# Patient Record
Sex: FEMALE | Race: WHITE | Employment: STUDENT | URBAN - METROPOLITAN AREA
[De-identification: names, ages, dates, MRNs, and addresses within clinical notes are randomized per-mention and may not be internally consistent; named-entity substitution may affect disease eponyms.]

---
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
## 2024-01-09 DIAGNOSIS — R49.0 VOICE HOARSENESS: ICD-10-CM

## 2024-01-10 DIAGNOSIS — R49.0 VOICE HOARSENESS: ICD-10-CM

## 2024-01-10 RX ORDER — LEVOCETIRIZINE DIHYDROCHLORIDE 5 MG/1
5 TABLET, FILM COATED ORAL EVERY EVENING
Qty: 90 TABLET | Refills: 3 | Status: SHIPPED | OUTPATIENT
Start: 2024-01-10

## 2024-01-10 NOTE — TELEPHONE ENCOUNTER
Refill passed per [facility] protocol.    Requested Prescriptions   Pending Prescriptions Disp Refills    levocetirizine 5 MG Oral Tab 90 tablet 3     Sig: Take 1 tablet (5 mg total) by mouth every evening.       Allergy Medication Protocol Passed - 1/9/2024 10:58 AM        Passed - In person appointment or virtual visit in the past 12 mos or appointment in next 3 mos     Recent Outpatient Visits              3 weeks ago Frontal headache    [facility]    5 months [facility]    8 months ago Well adult exam    [facility]    [facility] Visit    1 year ago Need for vaccination    [facility] [place] [place]    Nurse Only    1 year ago Examination, routine, over 18 years of age    [facility] [place]    Nurse Only                            Recent Outpatient Visits              3 weeks [facility]    5 months ago [facility] depression    [facility]    8 months ago Well adult exam    [facility]    [facility]    1 year ago Need for vaccination    [facility] [place]    Nurse Only    1 year ago Examination, routine, over 18 years of age    [facility]    Nurse Only

## 2024-01-10 NOTE — TELEPHONE ENCOUNTER
fluticasone propionate 50 MCG/ACT Nasal Suspension, 2 sprays by Each Nare route nightly., Disp: 1 each, Rfl: 1

## 2024-01-11 RX ORDER — FLUTICASONE PROPIONATE 50 MCG
2 SPRAY, SUSPENSION (ML) NASAL NIGHTLY
Qty: 1 EACH | Refills: 3 | Status: SHIPPED | OUTPATIENT
Start: 2024-01-11 | End: 2025-01-05

## 2024-01-11 NOTE — TELEPHONE ENCOUNTER
Refill passed per [facility] protocol.    Requested Prescriptions   Pending Prescriptions Disp Refills    fluticasone propionate 50 MCG/ACT Nasal Suspension 1 each 1     Si sprays by Each Nare route nightly.       Allergy Medication Protocol Passed - 1/10/2024  1:31 PM        Passed - In person appointment or virtual visit in the past 12 mos or appointment in next 3 mos     Recent Outpatient Visits              4 weeks ago Frontal headache    [facility]    [facility]    5 months ago [facility]    8 months ago Well adult exam    [facility]    [facility] Visit    1 year ago Need for vaccination    [facility] [place]    Nurse Only    1 year ago Examination, routine, over 18 years of age    [facility] [place]    Nurse Only                           Recent Outpatient Visits              4 weeks ago Frontal [facility]    [facility]    5 months ago [facility]xiety and depression    [facility]    [facility]    8 months ago Well adult exam    [facility]    [facility]    1 year ago Need for vaccination    [facility] [place]    Nurse Only    1 year ago Examination, routine, over 18 years of age    [facility] [place]    Nurse Only

## 2024-03-06 ENCOUNTER — APPOINTMENT (OUTPATIENT)
Dept: URBAN - METROPOLITAN AREA CLINIC 244 | Age: 19
Setting detail: DERMATOLOGY
End: 2024-03-07

## 2024-03-06 DIAGNOSIS — L70.0 ACNE VULGARIS: ICD-10-CM

## 2024-03-06 DIAGNOSIS — L71.8 OTHER ROSACEA: ICD-10-CM

## 2024-03-06 PROCEDURE — OTHER PRESCRIPTION: OTHER

## 2024-03-06 PROCEDURE — OTHER PRESCRIPTION MEDICATION MANAGEMENT: OTHER

## 2024-03-06 PROCEDURE — OTHER COUNSELING: OTHER

## 2024-03-06 PROCEDURE — 99204 OFFICE O/P NEW MOD 45 MIN: CPT

## 2024-03-06 RX ORDER — BRIMONIDINE TARTRATE 5 MG/G
GEL TOPICAL
Qty: 30 | Refills: 3 | Status: ERX

## 2024-03-06 RX ORDER — BRIMONIDINE TARTRATE 5 MG/G
GEL TOPICAL
Qty: 30 | Refills: 3 | Status: ERX | COMMUNITY
Start: 2024-03-06

## 2024-03-06 RX ORDER — TRETIONIN 0.25 MG/G
CREAM TOPICAL
Qty: 45 | Refills: 2 | Status: ERX

## 2024-03-06 RX ORDER — TRETIONIN 0.25 MG/G
CREAM TOPICAL
Qty: 45 | Refills: 2 | Status: ERX | COMMUNITY
Start: 2024-03-06

## 2024-03-06 ASSESSMENT — LOCATION SIMPLE DESCRIPTION DERM
LOCATION SIMPLE: RIGHT CHEEK
LOCATION SIMPLE: LEFT CHEEK
LOCATION SIMPLE: LEFT FOREHEAD
LOCATION SIMPLE: RIGHT FOREHEAD

## 2024-03-06 ASSESSMENT — LOCATION DETAILED DESCRIPTION DERM
LOCATION DETAILED: LEFT INFERIOR MEDIAL FOREHEAD
LOCATION DETAILED: LEFT CENTRAL MALAR CHEEK
LOCATION DETAILED: RIGHT CENTRAL MALAR CHEEK
LOCATION DETAILED: RIGHT INFERIOR MEDIAL FOREHEAD

## 2024-03-06 ASSESSMENT — LOCATION ZONE DERM: LOCATION ZONE: FACE

## 2024-03-06 NOTE — PROCEDURE: COUNSELING
Spironolactone Counseling: Patient advised regarding risks of diarrhea, abdominal pain, hyperkalemia, birth defects (for female patients), liver toxicity and renal toxicity. The patient may need blood work to monitor liver and kidney function and potassium levels while on therapy. The patient verbalized understanding of the proper use and possible adverse effects of spironolactone.  All of the patient's questions and concerns were addressed.
Topical Sulfur Applications Counseling: Topical Sulfur Counseling: Patient counseled that this medication may cause skin irritation or allergic reactions.  In the event of skin irritation, the patient was advised to reduce the amount of the drug applied or use it less frequently.   The patient verbalized understanding of the proper use and possible adverse effects of topical sulfur application.  All of the patient's questions and concerns were addressed.
Erythromycin Pregnancy And Lactation Text: This medication is Pregnancy Category B and is considered safe during pregnancy. It is also excreted in breast milk.
Birth Control Pills Counseling: Birth Control Pill Counseling: I discussed with the patient the potential side effects of OCPs including but not limited to increased risk of stroke, heart attack, thrombophlebitis, deep venous thrombosis, hepatic adenomas, breast changes, GI upset, headaches, and depression.  The patient verbalized understanding of the proper use and possible adverse effects of OCPs. All of the patient's questions and concerns were addressed.
Azelaic Acid Pregnancy And Lactation Text: This medication is considered safe during pregnancy and breast feeding.
Detail Level: Simple
Bactrim Counseling:  I discussed with the patient the risks of sulfa antibiotics including but not limited to GI upset, allergic reaction, drug rash, diarrhea, dizziness, photosensitivity, and yeast infections.  Rarely, more serious reactions can occur including but not limited to aplastic anemia, agranulocytosis, methemoglobinemia, blood dyscrasias, liver or kidney failure, lung infiltrates or desquamative/blistering drug rashes.
Topical Clindamycin Counseling: Patient counseled that this medication may cause skin irritation or allergic reactions.  In the event of skin irritation, the patient was advised to reduce the amount of the drug applied or use it less frequently.   The patient verbalized understanding of the proper use and possible adverse effects of clindamycin.  All of the patient's questions and concerns were addressed.
Sarecycline Counseling: Patient advised regarding possible photosensitivity and discoloration of the teeth, skin, lips, tongue and gums.  Patient instructed to avoid sunlight, if possible.  When exposed to sunlight, patients should wear protective clothing, sunglasses, and sunscreen.  The patient was instructed to call the office immediately if the following severe adverse effects occur:  hearing changes, easy bruising/bleeding, severe headache, or vision changes.  The patient verbalized understanding of the proper use and possible adverse effects of sarecycline.  All of the patient's questions and concerns were addressed.
Doxycycline Pregnancy And Lactation Text: This medication is Pregnancy Category D and not consider safe during pregnancy. It is also excreted in breast milk but is considered safe for shorter treatment courses.
Aklief Pregnancy And Lactation Text: It is unknown if this medication is safe to use during pregnancy.  It is unknown if this medication is excreted in breast milk.  Breastfeeding women should use the topical cream on the smallest area of the skin for the shortest time needed while breastfeeding.  Do not apply to nipple and areola.
Minocycline Counseling: Patient advised regarding possible photosensitivity and discoloration of the teeth, skin, lips, tongue and gums.  Patient instructed to avoid sunlight, if possible.  When exposed to sunlight, patients should wear protective clothing, sunglasses, and sunscreen.  The patient was instructed to call the office immediately if the following severe adverse effects occur:  hearing changes, easy bruising/bleeding, severe headache, or vision changes.  The patient verbalized understanding of the proper use and possible adverse effects of minocycline.  All of the patient's questions and concerns were addressed.
Tazorac Counseling:  Patient advised that medication is irritating and drying.  Patient may need to apply sparingly and wash off after an hour before eventually leaving it on overnight.  The patient verbalized understanding of the proper use and possible adverse effects of tazorac.  All of the patient's questions and concerns were addressed.
Dapsone Pregnancy And Lactation Text: This medication is Pregnancy Category C and is not considered safe during pregnancy or breast feeding.
Azithromycin Counseling:  I discussed with the patient the risks of azithromycin including but not limited to GI upset, allergic reaction, drug rash, diarrhea, and yeast infections.
Tetracycline Pregnancy And Lactation Text: This medication is Pregnancy Category D and not consider safe during pregnancy. It is also excreted in breast milk.
Winlevi Pregnancy And Lactation Text: This medication is considered safe during pregnancy and breastfeeding.
Use Enhanced Medication Counseling?: No
Topical Sulfur Applications Pregnancy And Lactation Text: This medication is Pregnancy Category C and has an unknown safety profile during pregnancy. It is unknown if this topical medication is excreted in breast milk.
Topical Retinoid counseling:  Patient advised to apply a pea-sized amount only at bedtime and wait 30 minutes after washing their face before applying.  If too drying, patient may add a non-comedogenic moisturizer. The patient verbalized understanding of the proper use and possible adverse effects of retinoids.  All of the patient's questions and concerns were addressed.
High Dose Vitamin A Counseling: Side effects reviewed, pt to contact office should one occur.
Birth Control Pills Pregnancy And Lactation Text: This medication should be avoided if pregnant and for the first 30 days post-partum.
Spironolactone Pregnancy And Lactation Text: This medication can cause feminization of the male fetus and should be avoided during pregnancy. The active metabolite is also found in breast milk.
Benzoyl Peroxide Counseling: Patient counseled that medicine may cause skin irritation and bleach clothing.  In the event of skin irritation, the patient was advised to reduce the amount of the drug applied or use it less frequently.   The patient verbalized understanding of the proper use and possible adverse effects of benzoyl peroxide.  All of the patient's questions and concerns were addressed.
Isotretinoin Counseling: Patient should get monthly blood tests, not donate blood, not drive at night if vision affected, not share medication, and not undergo elective surgery for 6 months after tx completed. Side effects reviewed, pt to contact office should one occur.
Topical Clindamycin Pregnancy And Lactation Text: This medication is Pregnancy Category B and is considered safe during pregnancy. It is unknown if it is excreted in breast milk.
Bactrim Pregnancy And Lactation Text: This medication is Pregnancy Category D and is known to cause fetal risk.  It is also excreted in breast milk.
Tazorac Pregnancy And Lactation Text: This medication is not safe during pregnancy. It is unknown if this medication is excreted in breast milk.
Erythromycin Counseling:  I discussed with the patient the risks of erythromycin including but not limited to GI upset, allergic reaction, drug rash, diarrhea, increase in liver enzymes, and yeast infections.
Azelaic Acid Counseling: Patient counseled that medicine may cause skin irritation and to avoid applying near the eyes.  In the event of skin irritation, the patient was advised to reduce the amount of the drug applied or use it less frequently.   The patient verbalized understanding of the proper use and possible adverse effects of azelaic acid.  All of the patient's questions and concerns were addressed.
Azithromycin Pregnancy And Lactation Text: This medication is considered safe during pregnancy and is also secreted in breast milk.
Aklief counseling:  Patient advised to apply a pea-sized amount only at bedtime and wait 30 minutes after washing their face before applying.  If too drying, patient may add a non-comedogenic moisturizer.  The most commonly reported side effects including irritation, redness, scaling, dryness, stinging, burning, itching, and increased risk of sunburn.  The patient verbalized understanding of the proper use and possible adverse effects of retinoids.  All of the patient's questions and concerns were addressed.
Doxycycline Counseling:  Patient counseled regarding possible photosensitivity and increased risk for sunburn.  Patient instructed to avoid sunlight, if possible.  When exposed to sunlight, patients should wear protective clothing, sunglasses, and sunscreen.  The patient was instructed to call the office immediately if the following severe adverse effects occur:  hearing changes, easy bruising/bleeding, severe headache, or vision changes.  The patient verbalized understanding of the proper use and possible adverse effects of doxycycline.  All of the patient's questions and concerns were addressed.
High Dose Vitamin A Pregnancy And Lactation Text: High dose vitamin A therapy is contraindicated during pregnancy and breast feeding.
Topical Retinoid Pregnancy And Lactation Text: This medication is Pregnancy Category C. It is unknown if this medication is excreted in breast milk.
Dapsone Counseling: I discussed with the patient the risks of dapsone including but not limited to hemolytic anemia, agranulocytosis, rashes, methemoglobinemia, kidney failure, peripheral neuropathy, headaches, GI upset, and liver toxicity.  Patients who start dapsone require monitoring including baseline LFTs and weekly CBCs for the first month, then every month thereafter.  The patient verbalized understanding of the proper use and possible adverse effects of dapsone.  All of the patient's questions and concerns were addressed.
Winlevi Counseling:  I discussed with the patient the risks of topical clascoterone including but not limited to erythema, scaling, itching, and stinging. Patient voiced their understanding.
Tetracycline Counseling: Patient counseled regarding possible photosensitivity and increased risk for sunburn.  Patient instructed to avoid sunlight, if possible.  When exposed to sunlight, patients should wear protective clothing, sunglasses, and sunscreen.  The patient was instructed to call the office immediately if the following severe adverse effects occur:  hearing changes, easy bruising/bleeding, severe headache, or vision changes.  The patient verbalized understanding of the proper use and possible adverse effects of tetracycline.  All of the patient's questions and concerns were addressed. Patient understands to avoid pregnancy while on therapy due to potential birth defects.
Benzoyl Peroxide Pregnancy And Lactation Text: This medication is Pregnancy Category C. It is unknown if benzoyl peroxide is excreted in breast milk.
Isotretinoin Pregnancy And Lactation Text: This medication is Pregnancy Category X and is considered extremely dangerous during pregnancy. It is unknown if it is excreted in breast milk.

## 2024-04-15 ENCOUNTER — NURSE TRIAGE (OUTPATIENT)
Dept: FAMILY MEDICINE CLINIC | Facility: CLINIC | Age: 19
End: 2024-04-15

## 2024-04-15 NOTE — TELEPHONE ENCOUNTER
Action Requested: Summary for Provider     []  Critical Lab, Recommendations Needed  [] Need Additional Advice  []   FYI    []   Need Orders  [] Need Medications Sent to Pharmacy  []  Other     SUMMARY: Currently out of State in [place] for [employer],had allergic reaction from the allergy skin test, frequent cough , went to ED and prednisone was prescribed but it is not helping her, still with frequent cough ,  instructed to go to  near her, or ED if with shortness of breath .     Reason for call: Medication Reaction (Allergy test reactions)  Onset: Wednesday       Allergist was the one who ordered the allergy skin test on Wednesday but not available to see her not until in  2 weeks, there is no on call per patient .   Frequent cough, feels like she has a cold.   No sob, no wheezes.           Reason for Disposition   MILD asthma attack (e.g., no SOB at rest, mild SOB with walking, speaks normally in sentences, mild wheezing) and lasting > 24 hours on prescribed treatment    Protocols used: Asthma Attack-A-[place]

## 2024-04-29 ENCOUNTER — TELEPHONE (OUTPATIENT)
Dept: PULMONOLOGY | Facility: CLINIC | Age: 19
End: 2024-04-29

## 2024-04-29 NOTE — TELEPHONE ENCOUNTER
Regarding: FW: Cough  Contact: [phone]        ----- Message -----  From: Karla Bonilla RN  Sent: 4/29/2024   1:36 PM CDT  To: Salma Stewart MD  Subject: Cough                                            ----- Message from Karla Bonilla RN sent at 4/29/2024  1:36 PM CDT -----       ----- Message from Richy Schmid TOBIN to Salma Stewart MD sent at 4/29/2024  9:46 AM -----   Good morning.  This is Richy's Mom.  Richy still has her cough.  She saw an ENT in [place] as well as the doctors at school and Urgent Care.  They have tried numerous medications:  Doxyclcline (2 times) Azithromycin, Bromphen and Benzonatate.  None of these helped.  Her ENT started her on Montelukast and this seems to be helping (but not 100%).  He suggested she see a Pulmonologist.  Not sure if this is Asthma or Allergies.  She did get tested for allergies and the panel came back negative, but she had a reaction 24 hours later (hives all over her arm) and ended up in the ER to control the rash from spreading.      I have tried a number of Pulmonologist  in [place] as she is returning from School on May 19th, but no one can see her til late August and September.  She is on the \"wait list\" for Dr. Jc Soliz but his first appt. was late September.  I am very concerned with not getting this addressed as the cough has lessoned,  is still there.  Richy says that it seems better when she is outside.  She does have a guinea pig but has had her for over a year so not sure if this is causing the cough.       Can you please help us get help for this?      Richy' Mom, Christi

## 2024-04-29 NOTE — TELEPHONE ENCOUNTER
Spoke with patient's mother Esperanza (RAFFI on file). Appointment scheduled for for 5/30/24 at 1:30PM. Verified date, time, place, and where to park. Mother verbalized understanding.

## 2024-04-29 NOTE — TELEPHONE ENCOUNTER
Salma Stewart MD   [facility] Clinical Staff27 minutes ago (3:38 PM)       Hi Staff     This young woman is currently at college and away and has been dealing with a cough for about 6 months.  She will be returning May 19.  Mother states she has seen multiple physicians including allergist and ENT and no relief of symptoms  She is try to get an appointment for her daughter.  Please see Clzby message  Anything earlier than August September available?  Thank you so much

## 2024-05-29 ENCOUNTER — TELEPHONE (OUTPATIENT)
Dept: PULMONOLOGY | Facility: CLINIC | Age: 19
End: 2024-05-29

## 2024-05-29 NOTE — TELEPHONE ENCOUNTER
Received office visit notes from ENT and Allergy dated 4/8/2024. Placed in Dr. Soliz folder for review.

## 2024-05-30 ENCOUNTER — OFFICE VISIT (OUTPATIENT)
Dept: PULMONOLOGY | Facility: CLINIC | Age: 19
End: 2024-05-30

## 2024-05-30 VITALS
WEIGHT: 143 LBS | SYSTOLIC BLOOD PRESSURE: 101 MMHG | DIASTOLIC BLOOD PRESSURE: 65 MMHG | HEART RATE: 74 BPM | OXYGEN SATURATION: 99 % | BODY MASS INDEX: 24.41 KG/M2 | RESPIRATION RATE: 14 BRPM | HEIGHT: 64 IN

## 2024-05-30 DIAGNOSIS — R05.3 CHRONIC COUGH: Primary | ICD-10-CM

## 2024-05-30 PROCEDURE — 99203 OFFICE O/P NEW LOW 30 MIN: CPT | Performed by: INTERNAL MEDICINE

## 2024-05-30 NOTE — PROGRESS NOTES
Dear  Salma  :           As you know, Richy is a 19-year-old female who I am now evaluating for lingering cough.       HISTORY OF PRESENT ILLNESS: November of last year the patient developed a head cold which then settled into the chest and took on the life of its own with constant hacking over the next 5 months.  Just approximately 1 month ago did the cough ultimately kaz primarily in response to the initiation of Singulair.  The patient is a student at [employer] and she lives with a guinea pig.  She had been trialed on antibiotics and steroids and nothing seemed to have helped her until the Singulair.  She also had tried Dulera.  She admits to having had shortness of breath with cough and wheezing and constantly clearing her throat.  She admits to postnasal drip.  There is no GERD.  She had a chest x-ray in December 2023 that was unremarkable.    PAST MEDICAL AND SURGICAL HISTORY:   1.  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 Ear nose and throat normal. Bowel normal. Bladder function normal. No depression. No thyroid disease. No lymphatic system concerns.  No rash. Muscles and joints unremarkable. No weight loss no weight gain.    PHYSICAL EXAMINATION: Physical Examination:  Vital signs normal. HEENT examination is unremarkable with pupils equal round and reactive to light and accommodation. Neck without adenopathy, thyromegaly, JVD nor bruit. Lungs clear to auscultation and percussion. Cardiac regular rate and rhythm no murmur. Abdomen nontender, without hepatosplenomegaly and no mass appreciable. Extremities and Musculoskeletal without clubbing cyanosis nor edema, and mobility  acceptable. Neurologic grossly intact with symmetric tone and strength and reflex.    LABORATORY: Chest x-ray December 2023 normal    ASSESSMENT AND PLAN:  PROBLEM 1.  Lingering cough-my strong suspicion is that the patient had mild cough equivalent asthma as well as probably a component of postviral airway hyperreactivity.  She lives with a guinea pig and everybody who lives with guinea pig along in health develops an allergic response.  She had an unusual response to cutaneous allergy testing where she was told that she was negative but then she ended up having to go to the emergency room due to severity of diffuse blistering of the upper extremities.  Strongly suspect allergic component.    RECOMMENDATIONS:  1.  Singulair as needed  2.  Reassurance  3.  Stay away from the guinea pig or at least keep the area extremely well ventilated.  4.  Contact me promptly if recurrence or new respiratory trouble.  Return on an as-needed basis.    I am delighted to assist in Richy's care.            With warmest regards,     Jc Soliz MD  Medical Director, Critical Care, [facility]  Medical Director, [facility]

## 2024-07-19 ENCOUNTER — NURSE TRIAGE (OUTPATIENT)
Dept: FAMILY MEDICINE CLINIC | Facility: CLINIC | Age: 19
End: 2024-07-19

## 2024-07-19 ENCOUNTER — HOSPITAL ENCOUNTER (OUTPATIENT)
Age: 19
Discharge: HOME | End: 2024-07-19
Attending: EMERGENCY MEDICINE
Payer: COMMERCIAL

## 2024-07-19 VITALS
HEART RATE: 79 BPM | OXYGEN SATURATION: 100 % | RESPIRATION RATE: 16 BRPM | SYSTOLIC BLOOD PRESSURE: 117 MMHG | TEMPERATURE: 98 F | DIASTOLIC BLOOD PRESSURE: 69 MMHG

## 2024-07-19 DIAGNOSIS — N76.0 ACUTE VAGINITIS: Primary | ICD-10-CM

## 2024-07-19 LAB
B-HCG UR QL: NEGATIVE
BILIRUB UR QL STRIP: NEGATIVE
CLARITY UR: CLEAR
COLOR UR: YELLOW
GLUCOSE UR STRIP-MCNC: NEGATIVE MG/DL
HGB UR QL STRIP: NEGATIVE
KETONES UR STRIP-MCNC: NEGATIVE MG/DL
NITRITE UR QL STRIP: NEGATIVE
PH UR STRIP: 5.5 [PH]
PROT UR STRIP-MCNC: NEGATIVE MG/DL
SP GR UR STRIP: 1.01
UROBILINOGEN UR STRIP-ACNC: <2 MG/DL

## 2024-07-19 PROCEDURE — 99214 OFFICE O/P EST MOD 30 MIN: CPT

## 2024-07-19 PROCEDURE — 99459 PELVIC EXAMINATION: CPT

## 2024-07-19 PROCEDURE — 81002 URINALYSIS NONAUTO W/O SCOPE: CPT

## 2024-07-19 PROCEDURE — 81025 URINE PREGNANCY TEST: CPT

## 2024-07-19 PROCEDURE — 81514 NFCT DS BV&VAGINITIS DNA ALG: CPT | Performed by: EMERGENCY MEDICINE

## 2024-07-19 PROCEDURE — 87591 N.GONORRHOEAE DNA AMP PROB: CPT | Performed by: EMERGENCY MEDICINE

## 2024-07-19 PROCEDURE — 87491 CHLMYD TRACH DNA AMP PROBE: CPT | Performed by: EMERGENCY MEDICINE

## 2024-07-19 RX ORDER — FLUCONAZOLE 150 MG/1
150 TABLET ORAL ONCE
Qty: 1 TABLET | Refills: 0 | Status: SHIPPED | OUTPATIENT
Start: 2024-07-19 | End: 2024-07-19

## 2024-07-19 NOTE — ED PROVIDER NOTES
Patient Seen in: [facility]      History     Chief Complaint   Patient presents with    Eval-G    Urinary Symptoms     Stated Complaint: possible Uti/ yeast infection    Subjective:   HPI    One week of vaginal itching, white discharge and burning. No urinary symptoms. No abdominal pain. Sexually active but last episode was 2 months ago.     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  Seen by ID - Repeat Quantiferon gold NEGATIVE Will follow up with ID in 6 months              Past Surgical History:   Procedure Laterality Date    [place] teeth removed  2019                Social History     Socioeconomic History    Marital status: Single   Tobacco Use    Smoking status: Never    Smokeless tobacco: Never    Tobacco comments:     No passive smoke exposur   Vaping Use    Vaping status: Never Used   Substance and Sexual Activity    Alcohol use: No    Drug use: No   Other Topics Concern    Second-hand smoke exposure No    Alcohol/drug concerns No    Violence concerns No    Reaction to local anesthetic No    Caffeine Concern No    Exercise Yes   Social History Narrative    5th Grade        The patient does not use an assistive device..      The patient does not live in a home with stairs.              Review of Systems    Positive for stated Chief Complaint: Eval-G and Urinary Symptoms    Other systems are as noted in HPI.  Constitutional and vital signs reviewed.      All other systems reviewed and negative except as noted above.    Physical Exam     ED Triage Vitals [07/19/24 1350]   /69   Pulse 79   Resp 16   Temp 97.9 °F (36.6 °C)   Temp src Temporal   SpO2 100 %   O2 Device None (Room air)       Current Vitals:   Vital Signs  BP: 117/69  Pulse: 79  Resp: 16  Temp: 97.9 °F (36.6 °C)  Temp src: Temporal    Oxygen Therapy  SpO2: 100 %  O2 Device: None (Room air)            Physical Exam  Vitals  and nursing note reviewed.   Constitutional:       Appearance: Normal appearance. She is well-developed.      Comments: Patient declined chaperone.    HENT:      Head: Normocephalic and atraumatic.   Cardiovascular:      Rate and Rhythm: Normal rate and regular rhythm.   Pulmonary:      Effort: Pulmonary effort is normal. No respiratory distress.   Abdominal:      General: There is no distension.      Palpations: Abdomen is soft.      Tenderness: There is no abdominal tenderness.   Genitourinary:     Vagina: Vaginal discharge (thick white) present.   Skin:     General: Skin is warm and dry.      Capillary Refill: Capillary refill takes less than 2 seconds.   Neurological:      General: No focal deficit present.      Mental Status: She is alert.      Sensory: No sensory deficit.   Psychiatric:         Mood and Affect: Mood normal.         Behavior: Behavior normal.              ED Course     Labs Reviewed   [facility] POCT URINALYSIS DIPSTICK - Abnormal; Notable for the following components:       Result Value    Leukocyte esterase urine Small (*)     All other components within normal limits   POCT PREGNANCY URINE - Normal   VAGINITIS VAGINOSIS PCR PANEL   CHLAMYDIA/GONOCOCCUS, KARLA                      MDM                                      Medical Decision Making  Yeast, BV, trich in differential. Exam findings consistent with yeast. Swabs pending. Discharge on fluconazole.     Disposition and Plan     Clinical Impression:  1. Acute vaginitis         Disposition:  Discharge  7/19/2024  2:08 pm    Follow-up:  Salma Stewart MD  [address] [place], [place] [id number] [phone]      As needed          Medications Prescribed:  Discharge Medication List as of 7/19/2024  2:15 PM        START taking these medications    Details   fluconazole 150 MG Oral Tab Take 1 tablet (150 mg total) by mouth once for 1 dose., Normal, Disp-1 tablet, R-0

## 2024-07-19 NOTE — ED INITIAL ASSESSMENT (HPI)
Presents with 1 week of vaginal itching, discharge, and burning sensation. Denies urinary frequency or dysuria. No abdominal pain.

## 2024-07-19 NOTE — TELEPHONE ENCOUNTER
Action Requested: Summary for Provider     []  Critical Lab, Recommendations Needed  [] Need Additional Advice  []   FYI    []   Need Orders  [] Need Medications Sent to Pharmacy  []  Other     SUMMARY: No appointments available at [facility] office, patient stated she will proceed to an Immediate [facility] for evaluation.     Reason for call: Urinary Symptoms  Onset: 1 week, pain is worsening     Reason for Disposition   Constant abdominal pain lasting > 2 hours    Answer Assessment - Initial Assessment Questions  1. DISCHARGE: \"Describe the discharge.\" (e.g., white, yellow, green, gray, foamy, cottage cheese-like)      Cottage cheese-like   2. ODOR: \"Is there a bad odor?\"      Yes   3. ONSET: \"When did the discharge begin?\"       1 week   4. RASH: \"Is there a rash in the genital area?\" If Yes, ask: \"Describe it.\" (e.g., redness, blisters, sores, bumps)      Redness and bumpy   5. ABDOMEN PAIN: \"Are you having any abdomen pain?\" If Yes, ask: \"What does it feel like? \" (e.g., crampy, dull, intermittent, constant)       Constant pain \"8-9/10  6. ABDOMEN PAIN SEVERITY: If present, ask: \"How bad is it?\" (e.g., Scale 1-10; mild, moderate, or severe)    - MILD (1-3): Doesn't interfere with normal activities, abdomen soft and not tender to touch.     - MODERATE (4-7): Interferes with normal activities or awakens from sleep, abdomen tender to touch.     - SEVERE (8-10): Excruciating pain, doubled over, unable to do any normal activities. (R/O peritonitis)       Severe pain 9/10  7. CAUSE: \"What do you think is causing the discharge?\" \"Have you had the same problem before? What happened then?\"      Never had before   8. OTHER SYMPTOMS: \"Do you have any other symptoms?\" (e.g., fever, itching, vaginal bleeding, pain with urination, injury to genital area, vaginal foreign body)      Itching   9. PREGNANCY: \"Is there any chance you are pregnant?\" \"When was your last menstrual period?\"      N/A    Answer Assessment - Initial Assessment  Questions  1. SYMPTOM: \"What's the main symptom you're concerned about?\" (e.g., frequency, incontinence)      Itching, low abdominal pain   2. ONSET: \"When did the pain start?\"      1 week ago  3. PAIN: \"Is there any pain?\" If Yes, ask: \"How bad is it?\" (Scale: 1-10; mild, moderate, severe)      \"8-9/10\"  4. CAUSE: \"What do you think is causing the symptoms?\"      Unknown, possible yeast infection or UTI  5. OTHER SYMPTOMS: \"Do you have any other symptoms?\" (e.g., blood in urine, fever, flank pain, pain with urination)      Patient denies   6. PREGNANCY: \"Is there any chance you are pregnant?\" \"When was your last menstrual period?\"      N/A    Protocols used: Urinary Symptoms-A-OH, Vaginal Iopztxhnv-P-XU

## 2024-07-20 LAB
BV BACTERIA DNA VAG QL NAA+PROBE: NEGATIVE
C GLABRATA DNA VAG QL NAA+PROBE: NEGATIVE
C KRUSEI DNA VAG QL NAA+PROBE: NEGATIVE
CANDIDA DNA VAG QL NAA+PROBE: POSITIVE
T VAGINALIS DNA VAG QL NAA+PROBE: NEGATIVE

## 2024-07-22 LAB
C TRACH DNA SPEC QL NAA+PROBE: NEGATIVE
N GONORRHOEA DNA SPEC QL NAA+PROBE: NEGATIVE

## 2024-07-29 ENCOUNTER — OFFICE VISIT (OUTPATIENT)
Dept: FAMILY MEDICINE CLINIC | Facility: CLINIC | Age: 19
End: 2024-07-29
Payer: COMMERCIAL

## 2024-07-29 VITALS
BODY MASS INDEX: 24.41 KG/M2 | TEMPERATURE: 98 F | HEART RATE: 69 BPM | DIASTOLIC BLOOD PRESSURE: 68 MMHG | HEIGHT: 64 IN | SYSTOLIC BLOOD PRESSURE: 102 MMHG | WEIGHT: 143 LBS

## 2024-07-29 DIAGNOSIS — Z00.00 WELL ADULT EXAM: Primary | ICD-10-CM

## 2024-07-29 DIAGNOSIS — Z97.5 IUD CONTRACEPTION: ICD-10-CM

## 2024-07-29 PROBLEM — F41.9 ANXIETY AND DEPRESSION: Status: RESOLVED | Noted: 2023-05-11 | Resolved: 2024-07-29

## 2024-07-29 PROBLEM — R05.3 CHRONIC COUGH: Status: RESOLVED | Noted: 2024-05-30 | Resolved: 2024-07-29

## 2024-07-29 PROBLEM — F32.A ANXIETY AND DEPRESSION: Status: RESOLVED | Noted: 2023-05-11 | Resolved: 2024-07-29

## 2024-07-29 PROCEDURE — 99395 PREV VISIT EST AGE 18-39: CPT | Performed by: FAMILY MEDICINE

## 2024-07-29 NOTE — PROGRESS NOTES
HPI:    Patient ID: Richy Scmhid is a 19 year old female.    HPI  Chief Complaint   Patient presents with    Well Adult       Wt Readings from Last 6 Encounters:   07/29/24 143 lb (64.9 kg)   05/30/24 143 lb (64.9 kg)   12/14/23 146 lb (66.2 kg) (76%, Z= 0.71)*   07/19/23 135 lb (61.2 kg) (63%, Z= 0.34)*   05/11/23 139 lb (63 kg) (70%, Z= 0.51)*   05/27/22 134 lb (60.8 kg) (66%, Z= 0.43)*     * Growth percentiles are based on [facility] (Girls, 2-20 Years) data.     BP Readings from Last 3 Encounters:   07/29/24 102/68   07/19/24 117/69   05/30/24 101/65     Going back to  school in [employer], mario year  Majoring exercise science, plans to go to Physical Therapy   Non smoker  Has an IUD    Review of Systems   Constitutional:  Negative for activity change, appetite change, chills, fatigue, fever and unexpected weight change.   HENT:  Negative for congestion, dental problem, drooling, ear discharge, ear pain, facial swelling, hearing loss, mouth sores, nosebleeds, postnasal drip, rhinorrhea, sinus pressure, sinus pain, sneezing, sore throat, tinnitus, trouble swallowing and voice change.    Eyes:  Negative for pain, discharge, redness and visual disturbance.   Respiratory:  Negative for cough, shortness of breath and wheezing.    Cardiovascular:  Negative for chest pain, palpitations and leg swelling.   Gastrointestinal:  Negative for abdominal pain, anal bleeding, blood in stool, constipation, diarrhea, nausea, rectal pain and vomiting.   Endocrine: Negative for cold intolerance, heat intolerance, polydipsia, polyphagia and polyuria.   Genitourinary:  Negative for decreased urine volume, difficulty urinating, dysuria, flank pain, frequency, menstrual problem, pelvic pain, urgency, vaginal bleeding, vaginal discharge and vaginal pain.        IUD   Musculoskeletal:  Negative for arthralgias, back pain and myalgias.   Skin:  Negative for rash.   Neurological:  Negative for dizziness, seizures,  syncope, weakness, numbness and headaches.   Hematological:  Does not bruise/bleed easily.   Psychiatric/Behavioral:  Negative for behavioral problems, decreased concentration, self-injury, sleep disturbance and suicidal ideas. The patient is not nervous/anxious.        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  Seen by ID - Repeat Quantiferon gold NEGATIVE Will follow up with ID in 6 months     Past Surgical History:   Procedure Laterality Date    [place] teeth removed  2019     Social History     Socioeconomic History    Marital status: Single     Spouse name: Not on file    Number of children: Not on file    Years of education: Not on file    Highest education level: Not on file   Occupational History    Not on file   Tobacco Use    Smoking status: Never    Smokeless tobacco: Never    Tobacco comments:     No passive smoke exposur   Vaping Use    Vaping status: Never Used   Substance and Sexual Activity    Alcohol use: No    Drug use: No    Sexual activity: Not on file   Other Topics Concern    Second-hand smoke exposure No    Alcohol/drug concerns No    Violence concerns No    Grew up on a farm Not Asked    History of tanning Not Asked    Outdoor occupation Not Asked    Pt has a pacemaker Not Asked    Pt has a defibrillator Not Asked    Breast feeding Not Asked    Reaction to local anesthetic No     Service Not Asked    Blood Transfusions Not Asked    Caffeine Concern No    Occupational Exposure Not Asked    Hobby Hazards Not Asked    Sleep Concern Not Asked    Stress Concern Not Asked    Weight Concern Not Asked    Special Diet Not Asked    Back Care Not Asked    Exercise Yes    Bike Helmet Not Asked    Seat Belt Not Asked    Self-Exams Not Asked   Social  History Narrative    5th Grade        The patient does not use an assistive device..      The patient does not live in a home with stairs.     Social Determinants of Health     Financial Resource Strain: Not on file   Food Insecurity: Not on file   Transportation Needs: Not on file   Physical Activity: Not on file   Stress: Not on file   Social Connections: Not on file   Housing Stability: Not on file     Family History   Problem Relation Age of Onset    Other (Other) Mother         Precancerous lesions - cervix    Cancer Maternal Grandmother         Breast/lung Ca - +smoker    Cancer Maternal Grandfather         Esophageal Ca    Diabetes Neg         family h/o    Migraines Neg         family h/o    Heart Disease Neg         family h/o    Heart Disorder Neg     Hypertension Neg     Lipids Neg     Actinic Keratosis Neg        Immunization History   Administered Date(s) Administered    Covid-19 Vaccine Pfizer 30 mcg/0.3 ml 04/23/2021, 05/14/2021, 12/13/2021    Covid-19 Vaccine Pfizer Bivalent 30mcg/0.3mL 11/02/2022    DTAP 04/14/2004, 06/23/2004, 08/11/2004, 08/31/2005    DTAP-IPV 03/16/2009    FLUZONE 6 months and older PFS 0.5 ml (77771) 10/08/2013, 10/16/2020    Flucelvax 0.5 Ml Quad PFS Single Dose 10/19/2019    Flucelvax 0.5ml Vaccine 11/19/2021    Fluvirin, 3 Years & >, Im 10/24/2015    HEP A 07/13/2011, 07/25/2012    HEP B 02/12/2004    HEP B/HIB 04/14/2004, 06/23/2004, 05/21/2005    Hpv Virus Vaccine 9 Esme Im 05/11/2016, 07/16/2016, 11/11/2016    IPV 08/11/2004, 11/23/2004, 08/31/2005    Influenza 11/03/2005, 11/01/2007, 10/21/2008, 10/24/2009, 10/26/2010, 10/19/2011, 10/24/2012, 10/08/2013, 10/19/2019, 11/19/2021    Influenza Vaccine, Preserv Free 11/28/2006    Influenza Virus Vaccine, H1N1 11/14/2009, 01/14/2010    MMR 02/23/2005, 05/27/2008    Meningococcal B, Omv 06/10/2022, 07/19/2022    Meningococcal-Menactra 05/06/2015    Meningococcal-Menveo 2month-55yr 05/27/2020    Pneumococcal Vaccine, Conjugate  04/14/2004, 06/23/2004, 08/11/2004, 05/21/2005    TDAP 05/06/2015    Varicella 02/23/2005, 05/27/2008       Health Maintenance   Topic Date Due    COVID-19 Vaccine (5 - 2023-24 season) 09/01/2023    Annual Depression Screening  01/01/2024    Annual Physical  05/11/2024    Influenza Vaccine (1) 10/01/2024    DTaP,Tdap,and Td Vaccines (7 - Td or Tdap) 05/06/2025    Pneumococcal Vaccine: Birth to 64yrs  Completed    Hepatitis B Vaccines  Completed    Hepatitis A Vaccines  Completed    MMR Vaccines  Completed    Varicella Vaccines  Completed    Meningococcal Vaccine  Completed    HPV Vaccines  Completed          Current Outpatient Medications   Medication Sig Dispense Refill    Levonorgestrel (MIRENA, 52 MG,) 20 MCG/DAY Intrauterine IUD       Tretinoin 0.05 % External Cream Apply to face nightly as tolerated 45 g 1     Allergies:  Allergies   Allergen Reactions    Penicillin V      Other reaction(s): upset stomach      PHYSICAL EXAM:     Chief Complaint   Patient presents with    Well Adult      Physical Exam  Vitals and nursing note reviewed.   Constitutional:       Appearance: She is well-developed.   HENT:      Head: Normocephalic and atraumatic.      Right Ear: External ear normal.      Left Ear: External ear normal.      Nose: Nose normal.      Mouth/Throat:      Pharynx: No oropharyngeal exudate.   Eyes:      General:         Right eye: No discharge.         Left eye: No discharge.      Conjunctiva/sclera: Conjunctivae normal.      Pupils: Pupils are equal, round, and reactive to light.   Neck:      Thyroid: No thyromegaly.   Cardiovascular:      Rate and Rhythm: Normal rate and regular rhythm.      Heart sounds: Normal heart sounds. No murmur heard.  Pulmonary:      Effort: Pulmonary effort is normal.      Breath sounds: Normal breath sounds. No wheezing.   Abdominal:      General: Bowel sounds are normal.      Palpations: Abdomen is soft. There is no mass.      Tenderness: There is no abdominal tenderness.    Musculoskeletal:         General: No tenderness.      Cervical back: Normal range of motion and neck supple.   Lymphadenopathy:      Cervical: No cervical adenopathy.   Skin:     General: Skin is dry.      Findings: No rash.   Neurological:      Mental Status: She is alert and oriented to person, place, and time.      Cranial Nerves: No cranial nerve deficit.      Motor: No abnormal muscle tone.      Coordination: Coordination normal.      Deep Tendon Reflexes: Reflexes are normal and symmetric. Reflexes normal.   Psychiatric:         Behavior: Behavior normal.         Thought Content: Thought content normal.         Judgment: Judgment normal.                ASSESSMENT/PLAN:     Return yearly for physicals  Follow up with dentist every 6 months  Follow up with eye doctor yearly  Recommend aerobic exercise for at least 30mins 5 days a week  Yearly flu shot  Tetanus booster every 10 years (Tdap/ Td)  Labs ordered/ or reviewed if done prior to appointment     Encounter Diagnoses   Name Primary?    Well adult exam Yes    IUD contraception        1. Well adult exam    - CBC With Differential With Platelet  - Comp Metabolic Panel (14)  - Lipid Panel  - TSH W Reflex To Free T4    2. IUD contraception        Orders Placed This Encounter   Procedures    CBC With Differential With Platelet    Comp Metabolic Panel (14)    Lipid Panel    TSH W Reflex To Free T4       The above note was creating using Dragon speech recognition technology. Please excuse any typos    Meds This Visit:  Requested Prescriptions      No prescriptions requested or ordered in this encounter       Imaging & Referrals:  None       ID#[id number]

## 2024-07-30 ENCOUNTER — OFFICE VISIT (OUTPATIENT)
Dept: OBGYN CLINIC | Facility: CLINIC | Age: 19
End: 2024-07-30
Payer: COMMERCIAL

## 2024-07-30 VITALS
BODY MASS INDEX: 24.41 KG/M2 | HEIGHT: 64 IN | DIASTOLIC BLOOD PRESSURE: 70 MMHG | WEIGHT: 143 LBS | SYSTOLIC BLOOD PRESSURE: 102 MMHG | HEART RATE: 85 BPM

## 2024-07-30 DIAGNOSIS — N92.1 BREAKTHROUGH BLEEDING WITH IUD: Primary | ICD-10-CM

## 2024-07-30 DIAGNOSIS — Z97.5 BREAKTHROUGH BLEEDING WITH IUD: Primary | ICD-10-CM

## 2024-07-30 DIAGNOSIS — N76.0 VAGINITIS AND VULVOVAGINITIS: ICD-10-CM

## 2024-07-30 PROCEDURE — 99213 OFFICE O/P EST LOW 20 MIN: CPT | Performed by: ADVANCED PRACTICE MIDWIFE

## 2024-07-31 ENCOUNTER — TELEPHONE (OUTPATIENT)
Dept: OBGYN CLINIC | Facility: CLINIC | Age: 19
End: 2024-07-31

## 2024-07-31 DIAGNOSIS — Z30.431 IUD CHECK UP: Primary | ICD-10-CM

## 2024-07-31 LAB
BV BACTERIA DNA VAG QL NAA+PROBE: NEGATIVE
C GLABRATA DNA VAG QL NAA+PROBE: NEGATIVE
C KRUSEI DNA VAG QL NAA+PROBE: NEGATIVE
CANDIDA DNA VAG QL NAA+PROBE: NEGATIVE
T VAGINALIS DNA VAG QL NAA+PROBE: NEGATIVE

## 2024-07-31 NOTE — TELEPHONE ENCOUNTER
Pt saw MS in the office yesterday for irregular menses. Pt currently has IUD. Pt states she was supposed to have ultrasound done, but no order placed. Please advise.

## 2024-07-31 NOTE — TELEPHONE ENCOUNTER
Mom and patient called to request an order for an ultrasound. Patient says she was instructed to have a panel done after office visit 7/30 which came back negative. Please advise.

## 2024-08-01 ENCOUNTER — TELEPHONE (OUTPATIENT)
Dept: OBGYN CLINIC | Facility: CLINIC | Age: 19
End: 2024-08-01

## 2024-08-01 NOTE — TELEPHONE ENCOUNTER
Patients mother calling to inform first available for vaginal ultra sound is not until the end of August. Patient leaves to college [place] this Saturday, patients mother asking for any recommendations on other locations to go that would be sooner. Please call at [phone],thanks.

## 2024-08-02 NOTE — TELEPHONE ENCOUNTER
Mom called to schedule and no openings until 8/17, patient leaves tomorrow morning back to [place]. Please advise

## 2024-08-02 NOTE — TELEPHONE ENCOUNTER
Talked to pt's mom, Esperanza. Esperanza states that pt is leaving early tomorrow morning to return back to school in [place]. Centralized scheduling offered pt a 3 pm pelvic ultrasound for today, but pt declines. Esperanza states pt already has a OB/Gyn doctor in [place] who is going to order pt a pelvic ultrasound as soon as she returns to [place]. Informed Esperanza this RN will let Yadira Foley CNM know pt's plan.

## (undated) DIAGNOSIS — D68.0 VON WILLEBRAND DISEASE (HCC): Primary | ICD-10-CM

## (undated) NOTE — Clinical Note
3/13/2017          To Whom It May Concern:    Talon Campbell is currently under my medical care. She may return to gym with no restrictions. If you require additional information please contact our office.         Sincerely,    Michel Fernando

## (undated) NOTE — LETTER
10/29/21          Charmaine Graves  :  2005      To Whom It May Concern:    Gym/Sports Restrictions:   Return to gym: Upper body workouts only, avoid lower body workouts  Sports: Volleyball practice OK, no running drills  Duration: 4 weeks

## (undated) NOTE — MR AVS SNAPSHOT
[facility] [address] [address] [address] [facility]  [phone]               Thank you for choosing us for your health care visit with Ryanne Allred MD.  We are glad to serve you and happy to provide yo Healthy Active Living  An initiative of the American Academy of Pediatrics    Fact Sheet: Healthy Active Living for Families    Healthy nutrition starts as early as infancy with breastfeeding.  Once your baby begins eating solid foods, introduce nutritious Karol.tn

## (undated) NOTE — LETTER
[employer] [facility] [employer] [facility] [employer] of Child Health Examination       Student's Name  Antonella Barahona above immunization history must sign below.   Signature                                                                                                 Title      MD                     Date  06/09/21     Signature Date  2/11/2004  Sex  Female School   Grade Level/ID#      HEALTH HISTORY          TO BE COMPLETED AND SIGNED BY PARENT/GUARDIAN AND VERIFIED BY HEALTH CARE PROVIDER    ALLERGIES  (Food, drug, insect, other)  Penicillin V MEDICATION  (List all prescribed o if <33 years old):   BP 93/56   Pulse 66   Ht 5' 4.75\"   Wt 59 kg (130 lb)   BMI 21.80 kg/m²     DIABETES SCREENING  BMI>85% age/sex  No And any two of the following:  Family History No    Ethnic Minority  No          Signs of Insulin Resistance (hyperte Yes        Currently Prescribed Asthma Medication:            Quick-relief  medication (e.g. Short Acting Beta Antagonist): No          Controller medication (e.g. inhaled corticosteroid):   No Other   NEEDS/MODIFICATIONS required in the school setting  No

## (undated) NOTE — LETTER
[facility] [place]  [address] [facility]  Dept: [phone]  Dept Fax: [phone]  Loc: [phone]      October 23, 2017    Patient: Monse Gregorio   Date of Visit: 10/23/2017       To Whom It May Concern:

## (undated) NOTE — LETTER
[address]   Date:   1/30/2020     Name:   Marybeth Gamino    YOB: 2005   MRN:   [id number]       [facility]? Jemal the areas on your body where you feel the described sensations.   Use the appropri

## (undated) NOTE — Clinical Note
Dear Karie Chicas,    I had the opportunity to see your patient Angelica Pisano recently. I am sending you this update, and I appreciate your confidence in me to care for your patients.  Please feel free call me with any questions at [phone] or contac

## (undated) NOTE — MR AVS SNAPSHOT
[place]  [address]  [phone]               Thank you for choosing us for your health care visit with Esperanza Ross MD.  We are glad to serve you and happy to provide you with this strange accept and enjoy it. It is also important to encourage play time as soon as they start crawling and walking. As your children grow, continue to help them live a healthy active lifestyle.     To lead a healthy active life, families can strive to reach these more embarrassed about having a checkup. Reassure your child that the exam is normal and necessary. Be aware that the healthcare provider may ask to talk with the child without you in the exam room.   School and social issues  Here are some topics you, your cause a stronger body odor. At this age, your child should begin to shower or bathe daily. Encourage your child to use deodorant and acne products as needed. · Body changes in girls. Early in puberty, breasts begin to develop.  One breast often starts to g worried about safety, find supervised indoor activities.   · Limit “screen time” to 1 to 2 hours each day. This includes time spent watching TV, playing video games, using the computer, and texting.  If your child has a TV, computer, or video game console i · TV, computer, and video games can agitate a child and make it hard to calm down for the night. Turn them off the at least an hour before bed. Instead, encourage your child to read before bed.   · If your child has a cell phone, make sure it’s turned off a · Sudden changes in your child’s mood, behavior, friendships, or activities can be warning signs of problems at school or in other aspects of your child’s life. If you notice signs like these, talk to your child and to the staff at your child’s school.  The [address]. All rights reserved. This information is not intended as a substitute for professional medical care. Always follow your healthcare professional's instructions.              Allergies as of May 24, 2017     Penicillin V     Other reaction(s): upset st

## (undated) NOTE — LETTER
[address] [address]   Date:   2/1/2021     Name:   Jo-Ann Fairbanks    YOB: 2005   MRN:   [id number]       [facility]?   Jemal the areas on your body where you feel the described sensatio

## (undated) NOTE — LETTER
VACCINE ADMINISTRATION RECORD  PARENT / GUARDIAN APPROVAL  Date: 2022  Vaccine administered to: Galdino Kate     : 2005    MRN: [id number]    A copy of the appropriate Centers for Disease Control and Prevention Vaccine Information statement has been provided. I have read or have had explained the information about the diseases and the vaccines listed below. There was an opportunity to ask questions and any questions were answered satisfactorily. I believe that I understand the benefits and risks of the vaccine cited and ask that the vaccine(s) listed below be given to me or to the person named above (for whom I am authorized to make this request). VACCINES ADMINISTERED:  Men B #2    I have read and hereby agree to be bound by the terms of this agreement as stated above. My signature is valid until revoked by me in writing. This document is signed by self, relationship: self on 2022.:                                                                                                             2022                Parent / [place] Dates Signature                                                Date    Leonard Ferrera served as a witness to authentication that the identity of the person signing electronically is in fact the person represented as signing. This document was generated by Leonard Ferrera on 2022.

## (undated) NOTE — LETTER
AUTHORIZATION FOR SURGICAL OPERATION OR OTHER PROCEDURE    1.  I hereby authorize Dr. Matt Julian and the [facility] staff assigned to my case to perform the following operation and/or procedure at the [facility]:    Left knee injection with ultrasound g Binu  2/11/2005  [id number]  _____________________________________________________  (please print)       Patient signature:  ___________________________________________________             Relationship to Patient:           []  Parent    Responsibl

## (undated) NOTE — LETTER
[address] [place]  [address] [facility]  [phone]        10/04/21        Angelica DICKSON:  2005      To Whom It May Concern:       This patient was

## (undated) NOTE — MR AVS SNAPSHOT
[place]  [address]  [phone]               Thank you for choosing us for your health care visit with Eliazbeth Orr MD.  We are glad to serve you and happy to provide you with this strange weekend appointments for your exam are available. Walk-in patients are welcome for most exams. [facility]/Anand and Aroldo Brewster  [facility]) ([address])  [address]   [address]

## (undated) NOTE — LETTER
Date & Time: 10/5/2018, 12:37 AM  Patient: Ty Wiliknson  Encounter Provider(s):    La Smith MD       To Whom It May Concern:    Ty Wilkinson was seen and treated in our department on 10/4/2018.  She should not participate

## (undated) NOTE — LETTER
[facility] [employer] of Child Health Examination       Student's Name  Karen Vanessa verifying above immunization history must sign below.   Signature                                                                                                                                    Title     MD                      Date  4/17/2019   [place] Student's Name  Felisha Weinstein Birth Date  2/11/2005  Sex  Female School   Grade Level/ID#  10th Grade   HEALTH HISTORY          TO BE COMPLETED AND SIGNED BY PARENT/GUARDIAN AND VERIFIED BY HEALTH CARE PROVIDER    ALLERGIES  (Food, drug, insect, o PHYSICAL EXAMINATION REQUIREMENTS (head circumference if <33 years old):   /63   Pulse 87   Ht 5' 4\" (1.626 m)   Wt 65.3 kg (144 lb)   LMP 03/28/2019 (Exact Date)   BMI 24.72 kg/m²     DIABETES SCREENING  BMI>85% age/sex  No And any two of the foll Cardiovascular/HTN Yes  Nutritional status Yes    Respiratory Yes                   Diagnosis of Asthma: No Mental Health Yes        Currently Prescribed Asthma Medication:            Quick-relief  medication (e.g. Short Acting Beta Antagonist):  No

## (undated) NOTE — LETTER
Name:  Gloria Park Year:  11th Grade Class: Student ID No.:   Address:  [address] [place] [address]  [address] Phone:  [phone] (home)  :  11year old   Name Relationship [address] Work Phone Home Phone Mobile Phone   1.  BR 13. Does anyone in your family have a heart problem, pacemaker, or implanted defibrillator? 12. Has anyone in your family had unexplained fainting, seizures, or near drowning?      BONE AND JOINT QUESTIONS Yes No   17. Have you ever had an injury to a b after being hit or falling? 39.Have you ever been unable to move your arms / legs after being hit /fall? 40. Have you ever become ill while exercising in the heat?     41. Do you get frequent muscle cramps when exercising? 42.  Do you or someone · Murmurs (auscultation standing, supine, +/- Valsalva)  · Location of point of maximal impulse (PMI) Yes    Pulses Yes    Lungs Yes    Abdomen Yes    Genitourinary (males only)* N/A    Skin:  HSV, lesions suggestive of MRSA, tinea corporis Yes    Neurolog Protocol.  We have reviewed the policy and understand that I/our student may be asked to submit to testing for the presence of performance-enhancing substances in my/his/her body either during [employer] state series events or during the school day, and I/our glen

## (undated) NOTE — Clinical Note
1/9/2017              [facility]        [address] [address]         To Whom It May Concern,    Please excuse Ludington Lazlaura from school 1/9/17 and 1/10/17 due to illness.       Sincerely,    Eduardo Ascencio MD  [facility]

## (undated) NOTE — ED AVS SNAPSHOT
Kayren Leventhal   MRN: [id number]    Department:  [facility]   Date of Visit:  10/4/2018           Disclosure     Insurance plans vary and the physician(s) referred by the ER may not be covered by your plan.  Please co CARE PHYSICIAN AT ONCE OR RETURN IMMEDIATELY TO THE EMERGENCY DEPARTMENT. If you have been prescribed any medication(s), please fill your prescription right away and begin taking the medication(s) as directed.   If you believe that any of the medications

## (undated) NOTE — MR AVS SNAPSHOT
[place]  [address]  [phone]               Thank you for choosing us for your health care visit with Sarah Patel MD.  We are glad to serve you and happy to provide you with this strange Imaging:  XR FINGER(S) (MIN 2 VIEWS), RIGHT THUMB (CPT=73140)    Instructions:   To schedule a test at any [facility], call Central Scheduling at [phone], Monday through Friday between 7:30am to 6pm and on Saturday betibrahima

## (undated) NOTE — LETTER
7/22/2023              [address]        [address]    Ms. Pat Siddiqi suffers from medical condition but is improved and stable. She has benefited significantly from having her pet guinea pig. She would like to bring her pet with her to school housing. Please allow if appropriate. Sincerely,          Geovani Sharma. MD Terry  [facility] Amirah Hemanth [address]  [address] [place], [place] [address] [phone]        Document electronically generated by:  John Stewart MD               Document electronically generated by:  Lasha Pichardo RN

## (undated) NOTE — LETTER
[facility] [employer] of Child Health Examination       Student's Name  Fletcher Lynn verifying above immunization history must sign below.   Signature                                                                                                                                    Title     MD                      Date  5/27/2020   [employer] Student's Name  Froilan Koenig Birth Date  2/11/2005  Sex  Female School   Grade Level/ID#  11th Grade   HEALTH HISTORY          TO BE COMPLETED AND SIGNED BY PARENT/GUARDIAN AND VERIFIED BY HEALTH CARE PROVIDER    ALLERGIES  (Food, drug, insect, o PHYSICAL EXAMINATION REQUIREMENTS (head circumference if <33 years old):   /71   Pulse 102   Ht 5' 4.5\" (1.638 m)   Wt 63.5 kg (140 lb)   BMI 23.66 kg/m²     DIABETES SCREENING  BMI>85% age/sex  No And any two of the following:  Family History No Respiratory Yes                   Diagnosis of Asthma: No Mental Health Yes        Currently Prescribed Asthma Medication:            Quick-relief  medication (e.g. Short Acting Beta Antagonist): No          Controller medication (e.g. inhaled corticostero

## (undated) NOTE — LETTER
[address] [place], [place] [address]PHYSIATR   Date:   10/4/2021     Name:   Cecy Batista    YOB: 2005   MRN:   [id number]       [facility]?   Jemal the areas on your body where you feel the describ

## (undated) NOTE — LETTER
[facility] [address] [facility] of Child Health Examination       Student's Name  Jaquan Hernandez verifying above immunization history must sign below.   Signature                                                                                                                                    Title         MD                  Date  5/16/2018    Signatu Student's Name  Twila Melendez Birth Date  2/11/2005  Sex  Female School   Grade Level/ID#  9th Grade   HEALTH HISTORY          TO BE COMPLETED AND SIGNED BY PARENT/GUARDIAN AND VERIFIED BY HEALTH CARE PROVIDER    ALLERGIES  (Food, drug, insect, oth PHYSICAL EXAMINATION REQUIREMENTS (head circumference if <33 years old):   BP 99/63   Pulse 71   Ht 5' 3.75\" (1.619 m)   Wt 61.7 kg (136 lb)   BMI 23.53 kg/m²     DIABETES SCREENING  BMI>85% age/sex  No And any two of the following:  Family History No Respiratory Yes                   Diagnosis of Asthma: No Mental Health Yes        Currently Prescribed Asthma Medication:            Quick-relief  medication (e.g. Short Acting Beta Antagonist): No          Controller medication (e.g. inhaled corticostero

## (undated) NOTE — LETTER
Name:  Gloria Park Year:    Class: Student ID No.:   Address:  [address] [place] [address] Phone:  [phone] (home)  :  15year old   Name Relationship [address] Work Phone Home Phone Mobile Phone   1.  [facility] anyone in your family have a heart problem, pacemaker, or implanted defibrillator? 12. Has anyone in your family had unexplained fainting, seizures, or near drowning?      BONE AND JOINT QUESTIONS Yes No   17. Have you ever had an injury to a bone, musc or falling? 39.Have you ever been unable to move your arms / legs after being hit /fall? 40. Have you ever become ill while exercising in the heat?     41. Do you get frequent muscle cramps when exercising? 42.  Do you or someone in your family standing, supine, +/- Valsalva)  · Location of point of maximal impulse (PMI) Yes    Pulses Yes    Lungs Yes    Abdomen Yes    Genitourinary (males only)* N/A    Skin:  HSV, lesions suggestive of MRSA, tinea corporis Yes    Neurologic* Yes    [place] submit to testing for the presence of performance-enhancing substances in my/his/her body either during [employer] state series events or during the school day, and I/our student do/does hereby agree to submit to such testing and analysis by a certified Betty Maguire

## (undated) NOTE — LETTER
Date & Time: 10/5/2018, 12:22 AM  Patient: Charmaine Graves  Encounter Provider(s):    Fatmata Minor MD       To Whom It May Concern:    Charmaine Graves was seen and treated in our department on 10/4/2018.  She should not participate

## (undated) NOTE — LETTER
[address] [place], [place] [address]PHYSIATR   Date:   10/18/2021     Name:   Neftaly uSng    YOB: 2005   MRN:   [id number]       [facility]?   Jemal the areas on your body where you feel the descri

## (undated) NOTE — LETTER
VACCINE ADMINISTRATION RECORD  PARENT / GUARDIAN APPROVAL  Date: 6/10/2022  Vaccine administered to: Luiz Figueredo     : 2005    MRN: [id number]    A copy of the appropriate Centers for Disease Control and Prevention Vaccine Information statement has been provided. I have read or have had explained the information about the diseases and the vaccines listed below. There was an opportunity to ask questions and any questions were answered satisfactorily. I believe that I understand the benefits and risks of the vaccine cited and ask that the vaccine(s) listed below be given to me or to the person named above (for whom I am authorized to make this request). VACCINES ADMINISTERED:    BEXSERO    I have read and hereby agree to be bound by the terms of this agreement as stated above. My signature is valid until revoked by me in writing. This document is signed by, relationship: Self on 6/10/2022.:                                                                                                   6/10/2022                      Parent / Merari Bump Signature                                                Date    Jann Simon served as a witness to authentication that the identity of the person signing electronically is in fact the person represented as signing. This document was generated by Jann Simon on 6/10/2022.

## (undated) NOTE — LETTER
VACCINE ADMINISTRATION RECORD  PARENT / GUARDIAN APPROVAL  Date: 2020  Vaccine administered to: Staci Mathew     : 2005    MRN: [id number]    A copy of the appropriate Centers for Disease Control and Prevention Vaccine Information sta

## (undated) NOTE — LETTER
[address] [place]  [address] [facility]  [phone]        10/18/21        Chi DICKSON:  2005      To Whom It May Concern:       This patient was

## (undated) NOTE — LETTER
[address] [place], [place] [address]PHYSIATR   Date:   9/20/2021     Name:   Ty Wilkinson    YOB: 2005   MRN:   [id number]       [facility]?   Jemal the areas on your body where you feel the describ

## (undated) NOTE — LETTER
[facility] [address]  [address]  [id number] [place] [address] [address] [address]  [phone]        21        Natty Sigala  :  2005      To Whom It May Concern: This patient was seen in our office on 21 .     Gym

## (undated) NOTE — Clinical Note
2/6/2017              [place] [address] [id number]         To Whom It May Concern,    Monse Darline was seen in the office today for injury.  Please excuse her from participation in gym and sport

## (undated) NOTE — ED AVS SNAPSHOT
[facility] AND [facility] in [address] [facility].  [facility] Alfonso Chavez    Phone:  [phone]    Fax:  [address]   MRN: [id number]    Department:  [facility] AND [facility] in [place] [address] It is our goal to assure that you are completely satisfied with every aspect of your visit today.   In an effort to constantly improve our service to you, we would appreciate any positive or negative feedback related to the care you received in our Immedi[facility] [employer] account. You may have had testing done that requires us to contact you. Please make sure we have your correct phone number on file.      OUR CURRENT HOURS OF OPERATION:  MONDAY THROUGH FRIDAY 8AM - 8PM  WEEKENDS AND HOLIDAYS 8AM - 6PM    I certifi visit, view other health information and more. To sign up or find more information on getting   Proxy Access to your child’s MyChart go to [email or web address] and click on the   Sign Up Forms link in the Additional Information box on the right.

## (undated) NOTE — ED AVS SNAPSHOT
Staci Mathew   MRN: [id number]    Department:  [facility] Emergency Department   Date of Visit:  3/31/2019           Disclosure     Insurance plans vary and the physician(s) referred by the ER may not be covered by your plan.  Please co CARE PHYSICIAN AT ONCE OR RETURN IMMEDIATELY TO THE EMERGENCY DEPARTMENT. If you have been prescribed any medication(s), please fill your prescription right away and begin taking the medication(s) as directed.   If you believe that any of the medications

## (undated) NOTE — Clinical Note
[address] [facility]       Student's Name  Bernadette Casillas Title                           Date    (If adding dates to the above immunization history section, put your initials by date(s) and sign here.)   ALTERNATIVE PROOF OF IMMUNITY   1 Diagnosis of asthma? Child wakes during the night coughing   Yes   No    Yes   No    Loss of function of one of paired organs? (eye/ear/kidney/testicle)   Yes   No      Birth Defects? Developmental delay? Yes   No    Yes   No  Hospitalizations? When? Signs of Insulin Resistance (hypertension, dyslipidemia, polycystic ovarian syndrome, acanthosis nigricans)          no                 At Risk   no   Lead Risk Questionnaire  Req'd for children 6 months thru 6 yrs enrolled in licensed or public elsy Needs/Restrictions     None   SPECIAL INSTRUCTIONS/DEVICES e.g. safety glasses, glass eye, chest protector for arrhythmia, pacemaker, prosthetic device, dental bridge, false teeth, athleticsupport/cup     None   MENTAL HEALTH/OTHER   Is there anything else